# Patient Record
Sex: FEMALE | Race: WHITE | Employment: OTHER | URBAN - METROPOLITAN AREA
[De-identification: names, ages, dates, MRNs, and addresses within clinical notes are randomized per-mention and may not be internally consistent; named-entity substitution may affect disease eponyms.]

---

## 2018-11-14 NOTE — PROGRESS NOTES
[address]  THERAPY AT [employer], [address] [facility], [address] - Phone: [phone]  Fax: [phone] / [address] [facility]  Patient Name: Katya Browne : 1960   Medical   Diagnosis: Right hip pain [M25.551]  Back pain [M54.9] Treatment Diagnosis: Right hip pain [M25.551]  Back pain [M54.9]   Onset Date:      Referral Source: Omari Ohara,* Start of Care [facility]): 11/15/2018   Prior Hospitalization: See medical history Provider #: [id number]   Prior Level of Function: Unrestricted and pain free running and lifting   Comorbidities: No significant PMH   Medications: Verified on Patient Summary List   The Plan of Care and following information is based on the information from the initial evaluation.   ==================================================================================  Assessment / key information: Patient is a 56 y.o. female who presents to [facility] with diagnosis of Right hip pain [M25.551]  Back pain [M54.9]. Patient reports right groin and posterior hip pain began about 1-2 years ago with insidious onset. Reports EMS visit last summer secondary to significant left groin pain, following participation in heavy yard work. Notes recent administration of prednisone which has significantly improved sx. MRI of right hip revealed no significant findings. Pain is located in the right groin and posterior aspect of the hip and is described as a constant pulling. Pt denies numbness and tingling radiating down the R LE, however, notes infrequent R lateral shin pain. Pain level is rated at 1/10 at the best, 3/10 currently, and 10/10 at the worst. R groin and posterior hip pain increases with transferring in and out of jeep, carrying grand daughter, sleeping in sidelying, running, yard work, repetitive forward bending, and prolonged sitting.  Upon objective evaluation, patient demonstrates impaired and painful trunk AROM in extension, MINDA sidebending, rotation left, and left side glide, decreased core and multifidus strength, and impaired flexibility of MINDA piriformis and hamstring musculature. Right LE myotomes were decreased and as follows: anterior tibialis (L4) 4/5, extensor hallicus (L5) 4/5, gluteus medius (L5) 3+/5, hamstring (S1/2) 4/5, and gluteus tim (S1/2) 3+/5. Right EMEKA and external rotary impingement special tests were positive indicating possible femoroacetabular impingement. All screening red flags and review of systems were cleared and negative with the exception of patient reporting unremitting night pain. Patient scored 50/100 on FOTO indicating decreased functional status and quality of life. Patient can benefit from skilled PT interventions to improve L/S ROM, flexibility, core strength, decrease pain and TTP and for education on posture, body mechanics and lifting mechanics/transfers to facilitate ADL's & overall functional status/quality of life.    ==================================================================================  Problem List: pain affecting function, decrease ROM, decrease strength, edema affecting function, impaired gait/ balance, decrease ADL/ functional abilities, decrease activity tolerance, decrease flexibility/ joint mobility and decrease transfer abilities   Treatment Plan may include any combination of the following: Therapeutic exercise, Therapeutic activities, Neuromuscular re-education, Physical agent/modality, dry needling, Gait/balance training, Manual therapy and Patient education  Patient / Family readiness to learn indicated by: asking questions, trying to perform skills and interest  Persons(s) to be included in education: patient (P)  Barriers to Learning/Limitations: no  Measures taken:    Patient Goal (s): \"Elimination of pain. Restore range of motion.  Resume running\"   Patient self reported health status: excellent  Rehabilitation Potential: good   Short Term Goals: To be accomplished in 3  weeks:  1) Establish HEP. 2) Patient will report decreased c/o pain to < or = 6/10 at the worst to facilitate improved sleeping tolerance with manageable sx in L/S.  3) Patient will report 25% improvement in ability to cross right leg over left (figure 4) in sitting in order to return to PLOF.  Long Term Goals: To be accomplished in 6 weeks:  1) Patient independent with HEP and able to demo proper body mechanics for floor to chest lifting. 2) Patient will increase L/S ROM in all directions to pain free and WNLs to increase ability to perform household chores. 3) Increase FOTO to 62/100 indicating improved function and quality of life. 4) Patient will report 75% improvement in ability to cross right leg over left (figure 4) in sitting in order to return to PLOF. 5) Patient to increase running tolerance to pain free for 35 min in order to improve ease with recreational activity. 6) Patient to increase right hip gluteus medius and tim strength to 4/5 in order to improve ease with heavy gardening duties.      Frequency / Duration:   Patient to be seen  2-3  times per week for 6  weeks:  Patient / Caregiver education and instruction: self care, activity modification and exercises  G-Codes (GP): n/a  Eval Complexity: History: LOW Complexity : Zero comorbidities / personal factors that will impact the outcome / POCExam:HIGH Complexity : 4+ Standardized tests and measures addressing body structure, function, activity limitation and / or participation in recreation  Presentation: MEDIUM Complexity : Evolving with changing characteristics  Clinical Decision Making:MEDIUM Complexity : FOTO score of 26-74Overall Complexity:MEDIUM    Therapist Signature: Canelo Garza Date: 51/98/6118   Certification Period: n/a Time: 6:51 PM   ==================================================================================  I certify that the above Physical Therapy Services are being furnished while the patient is under my care. I agree with the treatment plan and certify that this therapy is necessary. Physician Signature:        Date:       Time:     Please sign and return to [facility] or you may fax the signed copy to [phone]. Thank you.

## 2018-11-14 NOTE — PROGRESS NOTES
PHYSICAL THERAPY - DAILY TREATMENT NOTE    Patient Name: Tatyana Mcclain        Date: 11/15/2018  : 1960   YES Patient  Verified  Visit #:      18  Insurance: Payor:  / Plan: Ferny Maria / Product Type:  /      In time: 10:43 am Out time: 11:54 pm   Total Treatment Time: 70     BCBS and Medicare Time Tracking (below)   Total Timed Codes (min):  n/a 1:1 Treatment Time:  n/a     TREATMENT AREA =  Right hip pain [M25.551]  Back pain [M54.9]  SUBJECTIVE  Pain Level (on 0 to 10 scale):  0  / 10   Medication Changes/New allergies or changes in medical history, any new surgeries or procedures? NO    If yes, update Summary List   Subjective Functional Status/Changes:  []  No changes reported     See POC       OBJECTIVE    23 min Therapeutic Activity: Education anatomy and physiology of the lumbar spine and hip. Common causes of LBP. Centralization and peripheralization principals. Recommended frequent bouts of standing or ambulation following periods of prolonged sitting. Demonstration of proper upright sitting posture with use of Lumbar Criss roll. Importance of maintaining lordosis with sitting, standing, and sleeping posture. Rationale:    Improve positioning of L/S to improve the patients ability to tolerate prolonged sitting.      min Patient Education:  YES  Reviewed HEP   []  Progressed/Changed HEP based on: Other Objective/Functional Measures:  Physical Therapy Evaluation - Lumbar Spine ([facility])    SUBJECTIVE  Aggravated by:   [x] Bending [x] Sitting [] Standing [x] Walking   [] Moving [] Cough [] Sneeze [] Valsalva   [] AM  [] PM  Lying:  [] sup   [] pro   [x] sidelying   [x] Other: Lifting, yard work     General Health:  [employer] Indicated? [] Yes    [] No  [] Yes [x] No Recent weight change (If yes, due to dieting?  [] Yes  [] No)   [] Yes [x] No Weakness in legs during walking  [x] Yes [] No Unremitting pain at night   [] Yes [x] No Abdominal pain or problems  [] Yes [x] No Rectal bleeding  [] Yes [x] No Blood or pain with urination  [] Yes [x] No Dysfunction of bowel or bladder  [] Yes [x] No Numbness/tingling in buttock/genitalia region     Past History/Treatments:     Diagnostic Tests: [] Lab work [x] X-rays    [] CT [x] MRI     [] Other:  Results: No significant findings    OBJECTIVE  Posture:  Lateral Shift: [] R    [] L     [] +  [x] -  Kyphosis: [] Increased [] Decreased   [x]  WNL  Lordosis:  [] Increased [] Decreased   [x] WNL    Active Movements:   ROM % AROM % PROM Comments:pain, area   Forward flexion 40-60 100%     Extension 20-30 65%     SB right 20-30 75%     SB left 20-30 75%  R L/S pull   Rotation right 5-10 100%     Rotation left 5-10 85%  Tightness    Side glide right 85%     Side glide left 50%  R hip pain     Neuro Screen Dermatome: L2-S2 MINDA LE light touch sensation WNLs    Palpation  [x] Min  [] Mod  [] Severe    Location: L/S Paraspinals   [x] Min  [] Mod  [] Severe    Location: Quadratus Lumborum  [] Min  [x] Mod  [] Severe    Location: Piriformis (R>L)    Strength   L(0-5) R (0-5) N/T   Psoas  (L1,2) 4+ 4+    []   Quadriceps (L3,4) 5- 4+ []   Ant Tibialis (L4) 4+ 4 []   Extensor Hallicus (L5) 4+ 4    Gluteus Medius (L5) 4- 3+ []   Gastrocnemius (S1, S2) 3 3 []   Hamstring (S1,2) 5- 4 []   Gluteus Denis (S1, S2) 4- 3+ []     Special Tests    Sacroilliac:  Distraction: [] R    [] L    [] +    [x] -     Compression: [] +    [x] -      Thigh Thrust: [] +    [x] -                            Sacral Thrust: [] +    [] -      Leg Length: [x] +    [] -   Position: (+R ant/ L post)     Mobility: Standing flex: (-)                                      Gillet's March: (-)     Supine to sit: (R ant, left pos)         Hip: Bertrum Richardson:  [x] R    [] L    [x] +    [] -     Piriformis: [] R    [] L    [] +    [x] -          Deficits: Mason's: [] R    [x] L    [x] +    [] -     Hamstrings 90/90: R162/L160 deg  Optional Tests:  Hip Scour = R (-) / L (-)  EMEKA = R (+) / L (-)  FADDIR = R (+) / L (-)    Dural Mobility:  SLR Supine: [] R    [] L    [] +    [x] -  @ (degrees):   Slump Test: [] R    [] L    [] +    [x] -  @ (degrees):     Justification for Eval Complexity:   Patient History: LOW Complexity : Zero comorbidities / personal factors that will impact the outcome / POC (See POC for list of comorbidities)  Examination:HIGH Complexity : 4+ Standardized tests and measures addressing body structure, function, activity limitation and / or participation in recreation  (See POC and musculoskeletal examination attached)  Clinical Presentation: MEDIUM Complexity : Evolving with changing characteristics  (pain level 1-3/10 on average and 8/10 @ worst, nothing makes pain better, R LE radiating pain, groin pain, constant pain prior to prednisone)  Clinical Decision Making:LOW Complexity : FOTO score of  (Foto 50/100)  Overall Complexity:LOW      Post Treatment Pain Level (on 0 to 10) scale:   0  / 10     ASSESSMENT  Assessment/Changes in Function:     See POC     []  See Progress Note/Recertification   Patient will continue to benefit from skilled PT services to modify and progress therapeutic interventions, address functional mobility deficits, address ROM deficits, address strength deficits, analyze and address soft tissue restrictions, analyze and cue movement patterns, analyze and modify body mechanics/ergonomics and assess and modify postural abnormalities to attain remaining goals.    Progress toward goals / Updated goals:    See POC     PLAN  [x]  Upgrade activities as tolerated YES Continue plan of care   []  Discharge due to :    []  Other:      Therapist: Alejandro Dunbar DPT     Date: 11/15/2018 Time: 6:50 PM     Future Appointments   Date Time Provider Ivelisse Gleason   11/15/2018 11:00 AM Shay Perez

## 2018-11-15 ENCOUNTER — HOSPITAL ENCOUNTER (OUTPATIENT)
Dept: PHYSICAL THERAPY | Age: 58
Discharge: HOME | End: 2018-11-15
Payer: OTHER GOVERNMENT

## 2018-11-15 PROCEDURE — 97161 PT EVAL LOW COMPLEX 20 MIN: CPT

## 2018-11-15 PROCEDURE — 97530 THERAPEUTIC ACTIVITIES: CPT

## 2018-12-03 ENCOUNTER — HOSPITAL ENCOUNTER (OUTPATIENT)
Dept: PHYSICAL THERAPY | Age: 58
Discharge: HOME | End: 2018-12-03
Payer: OTHER GOVERNMENT

## 2018-12-03 PROCEDURE — 97110 THERAPEUTIC EXERCISES: CPT

## 2018-12-03 NOTE — PROGRESS NOTES
PHYSICAL THERAPY - DAILY TREATMENT NOTE    Patient Name: Monet Comment        Date: 12/3/2018  : 1960   YES Patient  Verified  Visit #:     Insurance: Payor: TATIANA / Plan: Gian Howard 74 / Product Type:  /      In time: 5:34  Out time: 6:38   Total Treatment Time: 59     BCBS and Medicare Time Tracking (below)   Total Timed Codes (min):  na 1:1 Treatment Time:  n/a     TREATMENT AREA =  Right hip pain [M25.551]  Back pain [M54.9]  SUBJECTIVE  Pain Level (on 0 to 10 scale):  5  / 10   Medication Changes/New allergies or changes in medical history, any new surgeries or procedures? NO    If yes, update Summary List   Subjective Functional Status/Changes:  []  No changes reported     Pt states \"yesterday I was moving furniture and all kinds of stuff, right groin\"         OBJECTIVE    Modality N/A     64 min Therapeutic Exercise:  [x]  See flow sheet   []  Other:      [x]  Added:   Foam rolling, clam, ham str, rep hip ext partial WB, REIL    to improve (function):    []  Changed:     Rationale: To increase ROM, flexibility, and increase strength to improve the patients ability to perform household chores     min Patient Education:  YES  Reviewed HEP   []  Progressed/Changed HEP based on: Other Objective/Functional Measures: Added foam rolling, clam, ham str, rep hip ext partial WB, REIL with good pt tolerance and no complaints of sx. L/S & LE MMT/AROM Pre Tx Post REIL   Pain location/sx R groin  Slight R groin   R Hamstring MMT 4+ 5-   R Ant tibialis MMT 4 4+   Forward flexion  100% NT    Extension  50% 50%   R/L SB 75% 75%   R/L Rotation 100/100% NT   R/L Sideglide 75/50% 75/50%   R Leg Cross over left patella Distal thigh  S/p partial WB rep hip ext        Post Treatment Pain Level (on 0 to 10) scale:   0  / 10     ASSESSMENT  Assessment/Changes in Function:     Pt presented with decreased MINDA SG and extension and R hamstring and ant tib weakness.  S/P REIL increased R ant tib and ham strength and eliminated R groin sx. KALIE increased R L/S pain. Rep partial WBing R hip ext improved ability to cross R ankle over L knee, however, NE on R groin p!.      []  See Progress Note/Recertification   Patient will continue to benefit from skilled PT services to modify and progress therapeutic interventions, address functional mobility deficits, address ROM deficits, address strength deficits, analyze and address soft tissue restrictions, analyze and cue movement patterns, analyze and modify body mechanics/ergonomics and assess and modify postural abnormalities to attain remaining goals. Progress toward goals / Updated goals:    First visit after initial evaluation. Progress tx per POC.         PLAN  [x]  Upgrade activities as tolerated YES Continue plan of care   []  Discharge due to :    []  Other:      Therapist: Emily Vargas DPT     Date: 12/3/2018 Time: 1:42 PM     Future Appointments   Date Time Provider Ivelisse Gleason   12/3/2018  5:30 PM [facility]   12/5/2018  5:30 PM [facility]   12/10/2018  5:30 PM [facility]   12/12/2018  5:30 PM [facility]   12/17/2018  5:30 PM [facility]   12/19/2018  5:30 PM [facility]

## 2018-12-05 ENCOUNTER — HOSPITAL ENCOUNTER (OUTPATIENT)
Dept: PHYSICAL THERAPY | Age: 58
Discharge: HOME | End: 2018-12-05
Payer: OTHER GOVERNMENT

## 2018-12-05 PROCEDURE — 97140 MANUAL THERAPY 1/> REGIONS: CPT

## 2018-12-05 PROCEDURE — 97110 THERAPEUTIC EXERCISES: CPT

## 2018-12-05 NOTE — PROGRESS NOTES
PHYSICAL THERAPY - DAILY TREATMENT NOTE    Patient Name: Tatyana Mcclain        Date: 2018  : 1960   YES Patient  Verified  Visit #:   3   of   16  Insurance: Payor:  / Plan: Ferny Maria / Product Type:  /      In time: 5:33 pm Out time: 6:35pm   Total Treatment Time: 58     BCBS and Medicare Time Tracking (below)   Total Timed Codes (min):  n/a 1:1 Treatment Time:  n/a     TREATMENT AREA =  Right hip pain [M25.551]  Back pain [M54.9]  SUBJECTIVE  Pain Level (on 0 to 10 scale):  3  / 10   Medication Changes/New allergies or changes in medical history, any new surgeries or procedures? NO    If yes, update Summary List   Subjective Functional Status/Changes:  []  No changes reported     Pt states \"I was sleeping last night and felt it in both groins, that was really unusual, I was in a 5 hour meeting\"         OBJECTIVE    Modality n/a     51 min Therapeutic Exercise:  [x]  See flow sheet   []  Other:      [x]  Added:  Clam and deadbug   to improve (function):    []  Changed:     Rationale: To increase ROM, flexibility, and increase strength to improve the patients ability to perform prolonged computer work. 11 min Manual Therapy: Prone trigger point release to MINDA piriformis, Rolling to MINDA TFL and glutes   Rationale:      decrease pain, increase ROM, increase tissue extensibility and decrease trigger points in L/S to improve patient's ability to tolerate prolonged standing. min Patient Education:  YES  Reviewed HEP   []  Progressed/Changed HEP based on: Other Objective/Functional Measures: Added RTB to clam and dead bug with good pt tolerance and no complaints of sx.    L/S & LE MMT/AROM Pre Tx Post REIL   Pain location/sx R groin  Slight R groin   R Hamstring MMT 4+ 5-   R Ant tibialis MMT 4 4+   Forward flexion  100% NT    Extension  50% NT   R/L SB 75% NT   R/L Rotation 100/100% NT   R/L Sideglide 75/75% NT        Post Treatment Pain Level (on 0 to 10) scale:   0  / 10     ASSESSMENT  Assessment/Changes in Function:     Pt presented with increased TTP and mm tone in MINDA piriformis and TFL. Attempted piriformis str and TFL stretch, however, both stretches increased MINDA groin pain. SANDHYA and REIL abolished MINDA groin pain and increased ant tib and hamstring strength MMT. Demonstrated decreased L/S extension in prone lying ~65%. Recommended using lumbar roll in sitting and adjusting sleep positioning. Updated and issued HEP. []  See Progress Note/Recertification   Patient will continue to benefit from skilled PT services to modify and progress therapeutic interventions, address functional mobility deficits, address ROM deficits, address strength deficits, analyze and address soft tissue restrictions, analyze and cue movement patterns, analyze and modify body mechanics/ergonomics and assess and modify postural abnormalities to attain remaining goals. Progress toward goals / Updated goals:    Progressing towards STG 1  1) Establish HEP.   Goal in progress - Updated and issued HEP (12/5/2018)       PLAN  [x]  Upgrade activities as tolerated YES Continue plan of care   []  Discharge due to :    []  Other:      Therapist: Anneliese Talamantes DPT     Date: 12/5/2018 Time: 4:23 PM     Future Appointments   Date Time Provider Ivelisse Gleason   12/5/2018  5:30 PM Alfonso Hence [facility]   12/10/2018  5:30 PM Alfonso Hence [facility]   12/12/2018  5:30 PM Alfonso [facility]   12/17/2018  5:30 PM Alfonso Hence [facility]   12/19/2018  5:30 PM Alfonso [facility]

## 2018-12-10 ENCOUNTER — HOSPITAL ENCOUNTER (OUTPATIENT)
Dept: PHYSICAL THERAPY | Age: 58
End: 2018-12-10
Payer: OTHER GOVERNMENT

## 2018-12-12 ENCOUNTER — HOSPITAL ENCOUNTER (OUTPATIENT)
Dept: PHYSICAL THERAPY | Age: 58
Discharge: HOME | End: 2018-12-12
Payer: OTHER GOVERNMENT

## 2018-12-12 PROCEDURE — 97140 MANUAL THERAPY 1/> REGIONS: CPT

## 2018-12-12 PROCEDURE — 97110 THERAPEUTIC EXERCISES: CPT

## 2018-12-12 NOTE — PROGRESS NOTES
PHYSICAL THERAPY - DAILY TREATMENT NOTE    Patient Name: Sophie Asif        Date: 2018  : 1960   YES Patient  Verified  Visit #:   4   of   16  Insurance: Payor: TATIANA / Plan: Ronel Hightower / Product Type:  /      In time: 5:34pm Out time: 6:40pm   Total Treatment Time: 77     BCBS and Medicare Time Tracking (below)   Total Timed Codes (min):  n/a 1:1 Treatment Time:  n/a     TREATMENT AREA =  Right hip pain [M25.551]  Back pain [M54.9]  SUBJECTIVE  Pain Level (on 0 to 10 scale):  1  / 10   Medication Changes/New allergies or changes in medical history, any new surgeries or procedures? NO    If yes, update Summary List   Subjective Functional Status/Changes:  []  No changes reported     Pt states \"I really do feel ok, but I really haven't been doing anything, Over the weekend I was moving boxes up and down stairs. Im not down as long as I used to be when I would flare up my back\"         OBJECTIVE    Modality Modalities Rationale: n/a     53 min Therapeutic Exercise:  [x]  See flow sheet   []  Other:      [x]  Added:  T/S extension FR mobe, prone quadruped LE extension, and SLR with core   to improve (function):    []  Changed:     Rationale: To increase ROM, flexibility, and increase strength to improve the patients ability to perform gardening duties    13 min Manual Therapy: Prone trigger point release to MINDA piriformis, Rolling to MINDA TFL and glutes   Rationale:      decrease pain, increase ROM, increase tissue extensibility and decrease trigger points in L/S to improve patient's ability to tolerate prolonged standing. min Patient Education:  YES  Reviewed HEP   []  Progressed/Changed HEP based on: Other Objective/Functional Measures:     Added T/S extension FR mobe, prone quadruped LE extension, and SLR with core with good pt tolerance and no complaints of sx.   /S & LE MMT/AROM Pre Tx Post REIL   Pain location/sx R groin  Slight R groin   R Hamstring MMT 5- NT   R Ant tibialis MMT 4 4+   Forward flexion  100% NT   Extension  50% NT   R/L SB 75/65% 75%   R/L Rotation 100/85% 100%   R/L Sideglide 50/50% R groin p! 75%        Post Treatment Pain Level (on 0 to 10) scale:   0  / 10     ASSESSMENT  Assessment/Changes in Function:     Pt presented with continued MINDA glute and L piriformis TTP and mm tone. Capsular hypomobility t/o mid thoracic spine. Significant VCing with prone quadruped LE extension for proper form, required dowel for tactile feed back. Demonstrates improved ROM and quality of movement with crossing right leg over left. Patient education in use of thera cane to release MINDA glute and piriformis muscle hypertonicity. KALIE continued to peripheralize R sided L/S sx, however, REIL abolishes sx. Encouraged compliance with REIL HEP. []  See Progress Note/Recertification   Patient will continue to benefit from skilled PT services to modify and progress therapeutic interventions, address functional mobility deficits, address ROM deficits, address strength deficits, analyze and address soft tissue restrictions, analyze and cue movement patterns, analyze and modify body mechanics/ergonomics and assess and modify postural abnormalities to attain remaining goals. Progress toward goals / Updated goals:    Progressing towards STGs 1-3.  1) Establish HEP. Goal not Met - Denies performance of REIL HEP - \"too busy and forgot\"  2) Patient will report decreased c/o pain to < or = 6/10 at the worst to facilitate improved sleeping tolerance with manageable sx in L/S. Goal in progress - 6-7/10 at the worst  3) Patient will report 25% improvement in ability to cross right leg over left (figure 4) in sitting in order to return to PLOF.  Goal in progress - \"I see improvement in crossing leg\", visible improvement in R hip ER with crossing leg         PLAN  [x]  Upgrade activities as tolerated YES Continue plan of care   []  Discharge due to :    []  Other:      Therapist: Chet Mina, VANE     Date: 12/12/2018 Time: 3:34 PM     Future Appointments   Date Time Provider Ivelisse Gleason   [facility]12[facility]2018  [facility]   12/17/2018  5:30 PM [facility]   12/19/2018  5:30 PM [facility]

## 2018-12-17 ENCOUNTER — HOSPITAL ENCOUNTER (OUTPATIENT)
Dept: PHYSICAL THERAPY | Age: 58
End: 2018-12-17
Payer: OTHER GOVERNMENT

## 2019-01-10 NOTE — PROGRESS NOTES
[address] [place] [address]  Phone: [phone]  Fax: [phone] SUMMARY  Patient Name: Jacalyn Kehr : 1960   Treatment/Medical Diagnosis: Right hip pain [M25.551]  Back pain [M54.9]   Referral Source: Beau Mata,*     Date of Initial Visit: 11/15/18 Attended Visits: 4 Missed Visits: 3     SUMMARY OF TREATMENT  Therapeutic exercises including ROM, strengthening, stretching, manual therapy including joint and soft tissue manipulation, work ergonomics, postural re-education, and HEP instruction. CURRENT STATUS  Unable to be formally assessed secondary to pt not returning to PT after 18 visit. Goal/Measure of Progress Goal Met? 1.  Establish HEP to prevent further disability. Status at last Eval: Goal Established Current Status: HEP established, pt not compliant performance of with HEP Partially Met   2. Patient will report decreased c/o pain to < or = 6/10 at the worst to facilitate improved sleeping tolerance with manageable sx in L/S   Status at last Eval: 10/10 at the worst Current Status: 6-7/10 at the worst Progressing   3. Patient will report 25% improvement in ability to cross right leg over left (figure 4) in sitting in order to return to PLOF. Status at last Eval: Goal Established Current Status: \"I see improvement in crossing leg\",   Vsible improvement in R hip ER with crossing R ankle over L knee Progressing     RECOMMENDATIONS  Other: Self-Discharge  If you have any questions/comments please contact us directly at [phone]. Thank you for allowing us to assist in the care of your patient.     Therapist Signature: Lisseth Lewis Date: 2018     Time: 6:33 PM

## 2020-07-14 ENCOUNTER — HOSPITAL ENCOUNTER (OUTPATIENT)
Dept: MAMMOGRAPHY | Age: 60
Discharge: HOME | End: 2020-07-14
Attending: FAMILY MEDICINE
Payer: OTHER GOVERNMENT

## 2020-07-14 ENCOUNTER — HOSPITAL ENCOUNTER (OUTPATIENT)
Dept: ULTRASOUND IMAGING | Age: 60
Discharge: HOME | End: 2020-07-14
Attending: FAMILY MEDICINE
Payer: OTHER GOVERNMENT

## 2020-07-14 DIAGNOSIS — N64.9 DISORDER OF BREAST: ICD-10-CM

## 2020-07-14 PROCEDURE — 77062 BREAST TOMOSYNTHESIS BI: CPT

## 2020-07-14 PROCEDURE — 76642 ULTRASOUND BREAST LIMITED: CPT
